# Patient Record
Sex: FEMALE | Race: WHITE | Employment: FULL TIME | URBAN - METROPOLITAN AREA
[De-identification: names, ages, dates, MRNs, and addresses within clinical notes are randomized per-mention and may not be internally consistent; named-entity substitution may affect disease eponyms.]

---

## 2021-01-23 ENCOUNTER — HOSPITAL ENCOUNTER (OUTPATIENT)
Dept: LAB | Age: 36
Setting detail: SPECIMEN
Discharge: HOME | End: 2021-01-23
Payer: COMMERCIAL

## 2021-01-23 DIAGNOSIS — Z01.818 PREOP TESTING: Primary | ICD-10-CM

## 2021-01-23 PROCEDURE — U0005 INFEC AGEN DETEC AMPLI PROBE: HCPCS

## 2021-01-23 PROCEDURE — U0003 INFECTIOUS AGENT DETECTION BY NUCLEIC ACID (DNA OR RNA); SEVERE ACUTE RESPIRATORY SYNDROME CORONAVIRUS 2 (SARS-COV-2) (CORONAVIRUS DISEASE [COVID-19]), AMPLIFIED PROBE TECHNIQUE, MAKING USE OF HIGH THROUGHPUT TECHNOLOGIES AS DESCRIBED BY CMS-2020-01-R: HCPCS

## 2021-01-25 LAB
SARS-COV-2, RAPID: NORMAL
SARS-COV-2: NORMAL
SARS-COV-2: NOT DETECTED
SOURCE: NORMAL

## 2021-01-26 ENCOUNTER — TELEPHONE (OUTPATIENT)
Dept: PRIMARY CARE CLINIC | Age: 36
End: 2021-01-26

## 2021-01-27 ENCOUNTER — HOSPITAL ENCOUNTER (OUTPATIENT)
Age: 36
Setting detail: OUTPATIENT SURGERY
Discharge: HOME | End: 2021-01-27
Attending: PLASTIC SURGERY | Admitting: PLASTIC SURGERY
Payer: COMMERCIAL

## 2021-01-27 VITALS
BODY MASS INDEX: 24.44 KG/M2 | RESPIRATION RATE: 16 BRPM | DIASTOLIC BLOOD PRESSURE: 84 MMHG | OXYGEN SATURATION: 99 % | TEMPERATURE: 97.9 F | WEIGHT: 165 LBS | SYSTOLIC BLOOD PRESSURE: 123 MMHG | HEART RATE: 79 BPM | HEIGHT: 69 IN

## 2021-01-27 PROCEDURE — 88305 TISSUE EXAM BY PATHOLOGIST: CPT

## 2021-01-27 PROCEDURE — 3600000012 HC SURGERY LEVEL 2 ADDTL 15MIN: Performed by: PLASTIC SURGERY

## 2021-01-27 PROCEDURE — 2709999900 HC NON-CHARGEABLE SUPPLY: Performed by: PLASTIC SURGERY

## 2021-01-27 PROCEDURE — 3600000002 HC SURGERY LEVEL 2 BASE: Performed by: PLASTIC SURGERY

## 2021-01-27 PROCEDURE — 7100000010 HC PHASE II RECOVERY - FIRST 15 MIN: Performed by: PLASTIC SURGERY

## 2021-01-27 PROCEDURE — 2500000003 HC RX 250 WO HCPCS: Performed by: PLASTIC SURGERY

## 2021-01-27 RX ORDER — CEPHALEXIN 500 MG/1
500 CAPSULE ORAL 3 TIMES DAILY
Qty: 21 CAPSULE | Refills: 0 | Status: SHIPPED | OUTPATIENT
Start: 2021-01-27 | End: 2021-02-01

## 2021-01-27 RX ORDER — BUPIVACAINE HYDROCHLORIDE AND EPINEPHRINE 5; 5 MG/ML; UG/ML
INJECTION, SOLUTION PERINEURAL PRN
Status: DISCONTINUED | OUTPATIENT
Start: 2021-01-27 | End: 2021-01-27 | Stop reason: ALTCHOICE

## 2021-01-27 RX ORDER — BUPIVACAINE HYDROCHLORIDE AND EPINEPHRINE 5; 5 MG/ML; UG/ML
INJECTION, SOLUTION PERINEURAL
Status: DISCONTINUED
Start: 2021-01-27 | End: 2021-01-27 | Stop reason: HOSPADM

## 2021-01-27 NOTE — H&P
Office Note     C. Tildon Hamman, MD, FACS     Subjective:      Patient ID: Inocente Paul is a 38 y.o. female.     HPI  11 mm left cheek lesion which is enlarging and widening. It is been there for quite some time. It is changing but has no drainage. Review of Systems     Past Medical History   No past medical history on file. Past Surgical History   No past surgical history on file. No Known Allergies  Current Facility-Administered Medications          Current Outpatient Medications   Medication Sig Dispense Refill    Multiple Vitamins-Minerals (ONE-A-DAY WOMENS VITACRAVES PO) Take by mouth        vitamin C (ASCORBIC ACID) 500 MG tablet Take 500 mg by mouth daily        Fexofenadine HCl (ALLER-EASE PO) Take by mouth        Fe Bisgly-Succ-C-Thre-B12-FA (IRON-150 PO) Take by mouth          No current facility-administered medications for this visit.          Social History               Socioeconomic History    Marital status:        Spouse name: Not on file    Number of children: Not on file    Years of education: Not on file    Highest education level: Not on file   Occupational History    Not on file   Social Needs    Financial resource strain: Not on file    Food insecurity       Worry: Not on file       Inability: Not on file    Transportation needs       Medical: Not on file       Non-medical: Not on file   Tobacco Use    Smoking status: Never Smoker    Smokeless tobacco: Never Used   Substance and Sexual Activity    Alcohol use:  Yes       Frequency: Monthly or less    Drug use: Never    Sexual activity: Not on file   Lifestyle    Physical activity       Days per week: Not on file       Minutes per session: Not on file    Stress: Not on file   Relationships    Social connections       Talks on phone: Not on file       Gets together: Not on file       Attends Gnosticism service: Not on file       Active member of club or organization: Not on file     Attends meetings of clubs or organizations: Not on file       Relationship status: Not on file    Intimate partner violence       Fear of current or ex partner: Not on file       Emotionally abused: Not on file       Physically abused: Not on file       Forced sexual activity: Not on file   Other Topics Concern    Not on file   Social History Narrative    Not on file         Family History   No family history on file. Review of systems is otherwise negative. Temp 97.5 °F (36.4 °C)   Resp 18   Ht 5' 9\" (1.753 m) Comment: stated  Wt 165 lb (74.8 kg)   BMI 24.37 kg/m²         Objective:   Physical Exam  Vitals signs and nursing note reviewed. Constitutional:       Appearance: She is well-developed. HENT:      Head: Normocephalic and atraumatic. Eyes:      Conjunctiva/sclera: Conjunctivae normal.      Pupils: Pupils are equal, round, and reactive to light. Neck:      Musculoskeletal: Normal range of motion and neck supple. Cardiovascular:      Rate and Rhythm: Normal rate. Pulmonary:      Effort: Pulmonary effort is normal. No respiratory distress. Breath sounds: No wheezing. Abdominal:      General: There is no distension. Palpations: Abdomen is soft. Musculoskeletal: Normal range of motion. General: No tenderness. Skin:     General: Skin is warm and dry. Findings: No erythema or rash. Neurological:      Mental Status: She is alert and oriented to person, place, and time. Psychiatric:         Behavior: Behavior normal.         Thought Content: Thought content normal.      11 mm left cheek obliquely oriented lesion in the midportion of the cheek. There is no erythema, there is no drainage.   This is enlarging and elevating.     Assessment:   Changing lesion left cheek                Plan:   Excision                  The patient was evaluated and examined with my nurse in the room at all times. Portions of this note were transcribed using Dragon voice recognition technology and as such may reflect some variations in voice recognition.     COVID-19 precautions were taken throughout the entire office visit. Patient was screened for COVID-19 symptoms and temperature was taken prior to coming back to the exam room. A mask as well as gloves was worn throughout the entire office visit and distancing maintained as much as possible in between the physical examination periods.     Sukhjinder De La Cruz MD  H & P reviewed.  The Patient was examined and there are no changes to the H & P

## 2021-01-28 NOTE — OP NOTE
Operative Note      Patient: Keyla Rees  YOB: 1985  MRN: [id number]    Date of Procedure: 1/27/2021    Pre-Op Diagnosis: LEFT CHEEK LESION    Post-Op Diagnosis: Same       Procedure(s):  FACIAL LESION BIOPSY EXCISION - CHEEK    Surgeon(s):  Best Tavarez MD    Assistant:   * No surgical staff found *    Anesthesia: Local    Estimated Blood Loss (mL): Minimal    Complications: None    Specimens:   ID Type Source Tests Collected by Time Destination   A : LEFT CHEEK LESION Tissue Face SURGICAL PATHOLOGY Best Tavarez MD 1/27/2021 1203        Implants:  * No implants in log *      Drains: * No LDAs found *    Findings: 11 mm left cheek lesion with irregular margins    Detailed Description of Procedure:   Patient outpatient surgical area the lesion on the left cheek was injected with half percent Marcaine 1-200,000 epinephrine local.  Patient was brought to the operating room in the left cheek was prepped and draped in a sterile fashion. Productive gauze was placed over the eyes to protect from the right surgical sites. After appropriate prep and drape and timeout elliptical excision with paralleling relaxed skin tension lines was performed. Bovie electrocautery of the base of the wound was performed and multilayer closure using 5-0 Monocryl was performed. Steri-Strips were applied.   Tolerated this well will follow the office in 2 weeks time with no complication needle and sponge count correct    Electronically signed by Best Tavarez MD on 1/28/2021 at 11:41 AM

## 2021-01-29 LAB — DERMATOLOGY PATHOLOGY REPORT: NORMAL

## (undated) DEVICE — MASTISOL ADHESIVE AMPULE

## (undated) DEVICE — SOLUTION SURG PREP POV IOD 7.5% 4 OZ

## (undated) DEVICE — PENCIL ES L3M BTTN SWCH HOLSTER W/ BLDE ELECTRD EDGE

## (undated) DEVICE — 3M™ STERI-STRIP™ BLEND TONE SKIN CLOSURES, B1551, TAN, 1/4 IN X 3 IN (6 MM X 75 MM), 3 STRIPS/ENVELOPE: Brand: 3M™ STERI-STRIP™

## (undated) DEVICE — MARKER,SKIN,WI/RULER AND LABELS: Brand: MEDLINE

## (undated) DEVICE — SUTURE MCRYL SZ 5-0 L18IN ABSRB UD L19MM PS-2 3/8 CIR PRIM Y495G

## (undated) DEVICE — SOLUTION IV IRRIG POUR BRL 0.9% SODIUM CHL 2F7124

## (undated) DEVICE — Z DISCONTINUED BY MEDLINE USE 2711682 TRAY SKIN PREP DRY W/ PREM GLV

## (undated) DEVICE — ELECTRODE PT RET AD L9FT HI MOIST COND ADH HYDRGEL CORDED

## (undated) DEVICE — SYRINGE MED 10ML LUERLOCK TIP W/O SFTY DISP

## (undated) DEVICE — GLOVE ORANGE PI 7 1/2   MSG9075

## (undated) DEVICE — SYRINGE, LUER LOCK, 10ML: Brand: MEDLINE

## (undated) DEVICE — MHPB HEAD AND NECK  PACK: Brand: MEDLINE INDUSTRIES, INC.

## (undated) DEVICE — GOWN,AURORA,NONRNF,XL,30/CS: Brand: MEDLINE

## (undated) DEVICE — STANDARD HYPODERMIC NEEDLE,POLYPROPYLENE HUB: Brand: MONOJECT

## (undated) DEVICE — ELECTRODE ELECSURG NDL 2.8 INX7.2 CM COAT INSUL EDGE